# Patient Record
Sex: FEMALE | Race: WHITE | NOT HISPANIC OR LATINO | Employment: OTHER | URBAN - METROPOLITAN AREA
[De-identification: names, ages, dates, MRNs, and addresses within clinical notes are randomized per-mention and may not be internally consistent; named-entity substitution may affect disease eponyms.]

---

## 2017-05-22 ENCOUNTER — OFFICE VISIT - HEALTHEAST (OUTPATIENT)
Dept: INTERNAL MEDICINE | Facility: CLINIC | Age: 37
End: 2017-05-22

## 2017-05-22 DIAGNOSIS — F41.8 SITUATIONAL ANXIETY: ICD-10-CM

## 2017-05-22 DIAGNOSIS — J30.9 ALLERGIC RHINITIS: ICD-10-CM

## 2017-05-22 DIAGNOSIS — F51.04 CHRONIC INSOMNIA: ICD-10-CM

## 2017-05-22 ASSESSMENT — MIFFLIN-ST. JEOR: SCORE: 1426.31

## 2017-06-06 ENCOUNTER — COMMUNICATION - HEALTHEAST (OUTPATIENT)
Dept: SCHEDULING | Facility: CLINIC | Age: 37
End: 2017-06-06

## 2017-07-14 ENCOUNTER — AMBULATORY - HEALTHEAST (OUTPATIENT)
Dept: MULTI SPECIALTY CLINIC | Facility: CLINIC | Age: 37
End: 2017-07-14

## 2017-07-14 LAB — PAP SMEAR - HIM PATIENT REPORTED: NORMAL

## 2017-09-07 ENCOUNTER — COMMUNICATION - HEALTHEAST (OUTPATIENT)
Dept: INTERNAL MEDICINE | Facility: CLINIC | Age: 37
End: 2017-09-07

## 2017-11-10 ENCOUNTER — OFFICE VISIT - HEALTHEAST (OUTPATIENT)
Dept: INTERNAL MEDICINE | Facility: CLINIC | Age: 37
End: 2017-11-10

## 2017-11-10 DIAGNOSIS — Z34.91 FIRST TRIMESTER PREGNANCY: ICD-10-CM

## 2017-11-10 DIAGNOSIS — F51.04 CHRONIC INSOMNIA: ICD-10-CM

## 2017-11-10 DIAGNOSIS — Z23 NEED FOR IMMUNIZATION AGAINST INFLUENZA: ICD-10-CM

## 2017-11-10 DIAGNOSIS — Z00.00 ROUTINE GENERAL MEDICAL EXAMINATION AT A HEALTH CARE FACILITY: ICD-10-CM

## 2017-11-10 ASSESSMENT — MIFFLIN-ST. JEOR: SCORE: 1458.07

## 2017-12-31 ENCOUNTER — RECORDS - HEALTHEAST (OUTPATIENT)
Dept: ADMINISTRATIVE | Facility: OTHER | Age: 37
End: 2017-12-31

## 2018-01-02 ENCOUNTER — OFFICE VISIT - HEALTHEAST (OUTPATIENT)
Dept: INTERNAL MEDICINE | Facility: CLINIC | Age: 38
End: 2018-01-02

## 2018-01-02 DIAGNOSIS — R05.9 COUGH: ICD-10-CM

## 2018-01-02 DIAGNOSIS — J18.9 PNEUMONIA: ICD-10-CM

## 2018-01-02 DIAGNOSIS — Z3A.17 17 WEEKS GESTATION OF PREGNANCY: ICD-10-CM

## 2018-01-02 ASSESSMENT — MIFFLIN-ST. JEOR: SCORE: 1503.42

## 2018-01-05 ENCOUNTER — OFFICE VISIT - HEALTHEAST (OUTPATIENT)
Dept: INTERNAL MEDICINE | Facility: CLINIC | Age: 38
End: 2018-01-05

## 2018-01-05 DIAGNOSIS — J18.9 RIGHT LOWER LOBE PNEUMONIA: ICD-10-CM

## 2018-01-05 ASSESSMENT — MIFFLIN-ST. JEOR: SCORE: 1503.42

## 2018-03-12 ENCOUNTER — OFFICE VISIT - HEALTHEAST (OUTPATIENT)
Dept: INTERNAL MEDICINE | Facility: CLINIC | Age: 38
End: 2018-03-12

## 2018-03-12 ENCOUNTER — COMMUNICATION - HEALTHEAST (OUTPATIENT)
Dept: INTERNAL MEDICINE | Facility: CLINIC | Age: 38
End: 2018-03-12

## 2018-03-12 DIAGNOSIS — J10.1 INFLUENZA B: ICD-10-CM

## 2018-03-12 DIAGNOSIS — R05.9 COUGH: ICD-10-CM

## 2018-03-12 LAB
FLUAV AG SPEC QL IA: ABNORMAL
FLUBV AG SPEC QL IA: ABNORMAL

## 2018-03-12 ASSESSMENT — MIFFLIN-ST. JEOR: SCORE: 1589.61

## 2018-03-13 ENCOUNTER — AMBULATORY - HEALTHEAST (OUTPATIENT)
Dept: INTERNAL MEDICINE | Facility: CLINIC | Age: 38
End: 2018-03-13

## 2018-03-13 ENCOUNTER — COMMUNICATION - HEALTHEAST (OUTPATIENT)
Dept: SCHEDULING | Facility: CLINIC | Age: 38
End: 2018-03-13

## 2018-06-24 ENCOUNTER — RECORDS - HEALTHEAST (OUTPATIENT)
Dept: ADMINISTRATIVE | Facility: OTHER | Age: 38
End: 2018-06-24

## 2018-06-27 ENCOUNTER — RECORDS - HEALTHEAST (OUTPATIENT)
Dept: ADMINISTRATIVE | Facility: OTHER | Age: 38
End: 2018-06-27

## 2019-09-19 ENCOUNTER — RECORDS - HEALTHEAST (OUTPATIENT)
Dept: ADMINISTRATIVE | Facility: OTHER | Age: 39
End: 2019-09-19

## 2019-09-26 ENCOUNTER — RECORDS - HEALTHEAST (OUTPATIENT)
Dept: ADMINISTRATIVE | Facility: OTHER | Age: 39
End: 2019-09-26

## 2019-10-03 ENCOUNTER — RECORDS - HEALTHEAST (OUTPATIENT)
Dept: ADMINISTRATIVE | Facility: OTHER | Age: 39
End: 2019-10-03

## 2019-10-08 ENCOUNTER — RECORDS - HEALTHEAST (OUTPATIENT)
Dept: ADMINISTRATIVE | Facility: OTHER | Age: 39
End: 2019-10-08

## 2019-10-12 ENCOUNTER — RECORDS - HEALTHEAST (OUTPATIENT)
Dept: ADMINISTRATIVE | Facility: OTHER | Age: 39
End: 2019-10-12

## 2019-10-14 ENCOUNTER — RECORDS - HEALTHEAST (OUTPATIENT)
Dept: ADMINISTRATIVE | Facility: OTHER | Age: 39
End: 2019-10-14

## 2019-11-05 ENCOUNTER — COMMUNICATION - HEALTHEAST (OUTPATIENT)
Dept: INTERNAL MEDICINE | Facility: CLINIC | Age: 39
End: 2019-11-05

## 2019-11-29 ENCOUNTER — OFFICE VISIT - HEALTHEAST (OUTPATIENT)
Dept: INTERNAL MEDICINE | Facility: CLINIC | Age: 39
End: 2019-11-29

## 2019-11-29 DIAGNOSIS — J30.9 ALLERGIC RHINITIS, UNSPECIFIED SEASONALITY, UNSPECIFIED TRIGGER: ICD-10-CM

## 2019-11-29 DIAGNOSIS — Z83.719 FAMILY HISTORY OF COLONIC POLYPS: ICD-10-CM

## 2019-11-29 DIAGNOSIS — G89.29 CHRONIC PAIN OF LEFT KNEE: ICD-10-CM

## 2019-11-29 DIAGNOSIS — Z13.220 LIPID SCREENING: ICD-10-CM

## 2019-11-29 DIAGNOSIS — R53.82 CHRONIC FATIGUE: ICD-10-CM

## 2019-11-29 DIAGNOSIS — I47.10 PAROXYSMAL SVT (SUPRAVENTRICULAR TACHYCARDIA) (H): ICD-10-CM

## 2019-11-29 DIAGNOSIS — K59.09 CHRONIC CONSTIPATION: ICD-10-CM

## 2019-11-29 DIAGNOSIS — F51.04 CHRONIC INSOMNIA: ICD-10-CM

## 2019-11-29 DIAGNOSIS — M25.562 CHRONIC PAIN OF LEFT KNEE: ICD-10-CM

## 2019-11-29 DIAGNOSIS — F41.8 DEPRESSION WITH ANXIETY: ICD-10-CM

## 2019-11-29 LAB
ALBUMIN SERPL-MCNC: 4.1 G/DL (ref 3.5–5)
ALP SERPL-CCNC: 61 U/L (ref 45–120)
ALT SERPL W P-5'-P-CCNC: 15 U/L (ref 0–45)
AST SERPL W P-5'-P-CCNC: 17 U/L (ref 0–40)
BILIRUB DIRECT SERPL-MCNC: <0.1 MG/DL
BILIRUB SERPL-MCNC: 0.2 MG/DL (ref 0–1)
CHOLEST SERPL-MCNC: 185 MG/DL
FASTING STATUS PATIENT QL REPORTED: YES
HDLC SERPL-MCNC: 70 MG/DL
HGB BLD-MCNC: 14 G/DL (ref 12–16)
LDLC SERPL CALC-MCNC: 102 MG/DL
PROT SERPL-MCNC: 7.4 G/DL (ref 6–8)
TRIGL SERPL-MCNC: 66 MG/DL
TSH SERPL DL<=0.005 MIU/L-ACNC: 0.52 UIU/ML (ref 0.3–5)

## 2019-11-29 ASSESSMENT — MIFFLIN-ST. JEOR: SCORE: 1512.5

## 2019-11-29 ASSESSMENT — PATIENT HEALTH QUESTIONNAIRE - PHQ9: SUM OF ALL RESPONSES TO PHQ QUESTIONS 1-9: 2

## 2019-12-01 ENCOUNTER — COMMUNICATION - HEALTHEAST (OUTPATIENT)
Dept: INTERNAL MEDICINE | Facility: CLINIC | Age: 39
End: 2019-12-01

## 2020-11-24 ENCOUNTER — RECORDS - HEALTHEAST (OUTPATIENT)
Dept: ADMINISTRATIVE | Facility: OTHER | Age: 40
End: 2020-11-24

## 2021-01-20 ENCOUNTER — VIRTUAL VISIT (OUTPATIENT)
Dept: URGENT CARE | Facility: CLINIC | Age: 41
End: 2021-01-20

## 2021-01-20 DIAGNOSIS — Z74.2 CAREGIVER NOT READILY AVAILABLE: Primary | ICD-10-CM

## 2021-01-20 PROCEDURE — 99207 PR NO BILLABLE SERVICE THIS VISIT: CPT | Mod: 25 | Performed by: EMERGENCY MEDICINE

## 2021-03-23 ENCOUNTER — OFFICE VISIT - HEALTHEAST (OUTPATIENT)
Dept: FAMILY MEDICINE | Facility: CLINIC | Age: 41
End: 2021-03-23

## 2021-03-23 DIAGNOSIS — J32.0 MAXILLARY SINUSITIS, UNSPECIFIED CHRONICITY: ICD-10-CM

## 2021-03-23 ASSESSMENT — PATIENT HEALTH QUESTIONNAIRE - PHQ9: SUM OF ALL RESPONSES TO PHQ QUESTIONS 1-9: 2

## 2021-04-30 ENCOUNTER — RECORDS - HEALTHEAST (OUTPATIENT)
Dept: ADMINISTRATIVE | Facility: OTHER | Age: 41
End: 2021-04-30

## 2021-05-26 ASSESSMENT — PATIENT HEALTH QUESTIONNAIRE - PHQ9: SUM OF ALL RESPONSES TO PHQ QUESTIONS 1-9: 2

## 2021-05-27 ASSESSMENT — PATIENT HEALTH QUESTIONNAIRE - PHQ9: SUM OF ALL RESPONSES TO PHQ QUESTIONS 1-9: 2

## 2021-05-31 VITALS — WEIGHT: 160 LBS | HEIGHT: 70 IN | BODY MASS INDEX: 22.9 KG/M2

## 2021-05-31 VITALS — HEIGHT: 70 IN | WEIGHT: 153 LBS | BODY MASS INDEX: 21.9 KG/M2

## 2021-05-31 VITALS — WEIGHT: 170 LBS | BODY MASS INDEX: 24.34 KG/M2 | HEIGHT: 70 IN

## 2021-06-01 VITALS — BODY MASS INDEX: 27.06 KG/M2 | WEIGHT: 189 LBS | HEIGHT: 70 IN

## 2021-06-02 ENCOUNTER — RECORDS - HEALTHEAST (OUTPATIENT)
Dept: ADMINISTRATIVE | Facility: OTHER | Age: 41
End: 2021-06-02

## 2021-06-03 NOTE — PROGRESS NOTES
Office Visit - Follow Up   Lexi Campuzano   39 y.o. female    Date of Visit: 11/29/2019    Chief Complaint   Patient presents with     Follow-up        Assessment and Plan   1. 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  However, will check appropriate metabolic studies.  Her BMP was normal in September  - Hepatic Profile  - Hemoglobin  - Thyroid Stimulating Hormone (TSH)    2. Chronic insomnia  She will use Benadryl occasionally    3. Depression with anxiety  Mood has been fine.  Some situational depression following miscarriage as would be expected.  Currently her PHQ 9 score is 2    4. Chronic knee pain, left  Chronic pain involving knee is doing much better    5. Allergic rhinitis, unspecified seasonality, unspecified trigger  She will use Zyrtec as needed    6. Chronic constipation  MiraLAX as needed    7. Lipid screening    - Lipid Cascade    8. Family history of colonic polyps  Younger sister found to have polyps.  Lexi had a colonoscopy which was normal in October 2019.    9.  Paroxysmal SVT-no recurrent symptoms with history of ablation.    Return in about 1 year (around 11/29/2020) for Recheck.     History of Present Illness   This 39 y.o. old woman with history of paroxysmal SVT treated with ablation, history of depression with anxiety doing well without medication, and chronic knee pain here to follow-up.  Overall doing well.  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  She had a miscarriage this summer which left her feeling down.  She has chronic insomnia and will take Benadryl occasionally.  Mood doing better since time of miscarriage.  Chronic left knee pain is doing better.  Using Zyrtec for allergies.  Using MiraLAX for chronic constipation.  Sister found to have polyps and she had a colonoscopy which was normal.  History of paroxysmal atrial  "fibrillation.  No recurrent palpitations.  History of ablation.    Review of Systems:  Otherwise, a comprehensive review of systems was negative except as noted.     Medications, Allergies and Problem List   Patient Active Problem List   Diagnosis     Paroxysmal SVT (supraventricular tachycardia) (H)     Bilateral shoulder pain     Depression with anxiety     Chronic insomnia     Chronic knee pain, left     Diffuse arthralgia     Allergic rhinitis     Situational anxiety     Chronic fatigue     Family history of colonic polyps     Chronic constipation       She has a past surgical history that includes Knee surgery (Left, 2013); Nasal septum surgery (2010); and Tonsillectomy (2013).    Allergies   Allergen Reactions     Cymbalta [Duloxetine]      Weight gain     Lexapro [Escitalopram Oxalate]      Fatigue     Zolpidem      Ineffective         Current Outpatient Medications   Medication Sig Dispense Refill     calcium-vitamin D (CALCIUM-VITAMIN D) 500 mg(1,250mg) -200 unit per tablet Take 1 tablet by mouth 2 (two) times a day with meals.       cetirizine (ZYRTEC) 10 MG tablet Take 10 mg by mouth daily.       cholecalciferol, vitamin D3, (CHOLECALCIFEROL) 1,000 unit tablet Take 1,000 Units by mouth daily.       diphenhydrAMINE (BENADRYL) 25 mg capsule Take 25 mg by mouth at bedtime as needed for itching.       multivitamin therapeutic (THERAGRAN) tablet Take 1 tablet by mouth daily.       No current facility-administered medications for this visit.         Physical Exam   General Appearance:   Well-appearing young woman    /60 (Patient Site: Left Arm, Patient Position: Sitting, Cuff Size: Adult Large)   Pulse 84   Ht 5' 9.5\" (1.765 m)   Wt 172 lb (78 kg)   SpO2 99%   BMI 25.04 kg/m      HEENT: Normal  Neck without lymphadenopathy or thyromegaly  Respiratory: Normal respiratory effort.  Lungs are clear with no rales or wheezes.  Heart: Regular rate and rhythm without murmurs, rubs, or gallops.  No carotid " bruits.  Abdomen: Abdomen is soft, nontender without guarding, rebound, masses, or hepatosplenomegaly.  Neurologic: Grossly nonfocal  Skin: No cyanosis or pallor  Psych: Alert and oriented ×3, mood appropriate.  PHQ 9 score is 2         Additional Information   Social History     Tobacco Use     Smoking status: Never Smoker     Smokeless tobacco: Never Used   Substance Use Topics     Alcohol use: Not on file     Drug use: Not on file         Review and/or order of clinical lab tests: Ordering TSH, lipid profile, LFTs and hemoglobin  Review and/or order of radiology tests: Reviewed CTA of head and neck normal September 2019 when presenting with headache  Review and/or order of medicine tests: Reviewed results of colonoscopy October 2019 normal without polyps       Juan Kearney MD

## 2021-06-04 VITALS
SYSTOLIC BLOOD PRESSURE: 110 MMHG | DIASTOLIC BLOOD PRESSURE: 60 MMHG | WEIGHT: 172 LBS | OXYGEN SATURATION: 99 % | BODY MASS INDEX: 24.62 KG/M2 | HEIGHT: 70 IN | HEART RATE: 84 BPM

## 2021-06-06 ENCOUNTER — RECORDS - HEALTHEAST (OUTPATIENT)
Dept: ADMINISTRATIVE | Facility: OTHER | Age: 41
End: 2021-06-06

## 2021-06-06 ENCOUNTER — HEALTH MAINTENANCE LETTER (OUTPATIENT)
Age: 41
End: 2021-06-06
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
## 2021-06-15 NOTE — PROGRESS NOTES
Office Visit - Follow Up   Lexi Salcido   37 y.o. female    Date of Visit: 1/5/2018    Chief Complaint   Patient presents with     Follow-up     pneumonia        Assessment and Plan   Right lower lobe pneumonia-we will treat with another round of Zithromax.  Continue Mucinex and Tylenol.    Advised against using Diflucan as it is category C.  She should use OTC Monistat and eat yogurt.    No Follow-up on file.     History of Present Illness   This 37 y.o. old woman is here to follow-up diagnosis of pneumonia.  She is 17 weeks pregnant.  Chest x-ray showed right lower lobe infiltrate.  She had had persistent cough for 2 weeks.  Given Z-Luis Eduardo.  Was seen 2 days ago and is following up again today.  She took her last dose yesterday.  Still feeling fatigued and run down.  Cough is ongoing.  No wheezing.  Also using Tylenol and OTC Mucinex.    Review of Systems:  Otherwise, a comprehensive review of systems was negative except as noted.     Medications, Allergies and Problem List   Patient Active Problem List   Diagnosis     Paroxysmal SVT (supraventricular tachycardia)     Bilateral shoulder pain     Depression with anxiety     Chronic insomnia     Chronic knee pain, left     Diffuse arthralgia     Allergic rhinitis     Situational anxiety       She has a past surgical history that includes Knee surgery (Left, 2013); Nasal septum surgery (2010); and Tonsillectomy (2013).    Allergies   Allergen Reactions     Cymbalta [Duloxetine]      Weight gain     Lexapro [Escitalopram Oxalate]      Fatigue     Zolpidem      Ineffective         Current Outpatient Prescriptions   Medication Sig Dispense Refill     calcium-vitamin D (CALCIUM-VITAMIN D) 500 mg(1,250mg) -200 unit per tablet Take 1 tablet by mouth 2 (two) times a day with meals.       cetirizine (ZYRTEC) 10 MG tablet Take 10 mg by mouth daily.       cholecalciferol, vitamin D3, (CHOLECALCIFEROL) 1,000 unit tablet Take 1,000 Units by mouth daily.       diphenhydrAMINE  "(BENADRYL) 25 mg capsule Take 25 mg by mouth at bedtime as needed for itching.       multivitamin therapeutic (THERAGRAN) tablet Take 1 tablet by mouth daily.       azithromycin (ZITHROMAX Z-OSWALD) 250 MG tablet Take 2 tablets (500 mg) on  Day 1,  followed by 1 tablet (250 mg) once daily on Days 2 through 5. 6 tablet 0     No current facility-administered medications for this visit.         Physical Exam   General Appearance:   Well-appearing woman    /60 (Patient Site: Left Arm, Patient Position: Sitting, Cuff Size: Adult Regular)  Pulse 89  Ht 5' 9.5\" (1.765 m)  Wt 170 lb (77.1 kg)  SpO2 99%  BMI 24.74 kg/m2    HEENT: Normal, oropharynx normal  Neck without lymphadenopathy  Respiratory: Normal respiratory effort.  Lungs are clear with no rales or wheezes.  Heart: Regular rate and rhythm        Additional Information   Social History   Substance Use Topics     Smoking status: Never Smoker     Smokeless tobacco: Never Used     Alcohol use None              Juan Kearney MD  "

## 2021-06-15 NOTE — PROGRESS NOTES
Office Visit - Follow Up   Lexi Salcido   37 y.o. female    Date of Visit: 1/2/2018    Chief Complaint   Patient presents with     Cough     went to urgency room and was dx with pneumonia. 17 weeks pregnant. Took 3 doses of Zpack.         Assessment and Plan   1. Cough  Coughing since mid December accompanied by feeling rundown and tired.  Was on the go all the time.  Came from [place] for a holiday in early December followed by a business trips to [place] and [place] and followed by trip to [place] to attend a wedding a week ago.  Continued to work despite her cough.  Finally went to urgency room on 12/31/2017 to be evaluated because she was feeling weak and tired.    2. Pneumonia  Was worked up in the  urgency room on 12/31/2017.  Chest x-ray showed right lower lobe pneumonia.  Had normal CBC specifically white blood cell count of 6.3 and normal basic metabolic panel.  Was treated with Z-Luis Eduardo which she is finishing for 2 more days.    3. 17 weeks gestation of pregnancy  Pregnant  at 17 weeks.  Z-Luis Eduardo is category B for pregnancy. Tylenol for aches and pains, loratadine for congestion and guaifenesin for cough are safe for pregnancy.  Discussed these with the patient and .    Reviewed emergency room notes and workups, labs and chest x-ray    Advised to rest and not to report for work until she is better.  Advised  to increase fluid intake or to push fluids.  Also to rest her voice since it is getting hoarse.      Follow up with Dr. Kearney in 3 days as scheduled previously.     History of Present Illness   This 37 y.o. old female patient of Dr. Kearney came to see me for follow-up of her pneumonia.  Has been coughing since early December.  Did not have time to rest.  Has been  always  on the go all the time since since early this month.  Went for a holiday in [place] and came back mid December.  Then went for business trips to [place] and [place] followed by trip to [place] a week ago for   a wedding. After which she became weak and tired.  Did did not have have appetite to eat.  So she went to the urgency room.  Was worked up.  Was found to have right lower lobe pneumonia on her chest x-ray  but had normal white cell count.  Was treated with Z-Luis Eduardo.  Seems to be feeling better but still coughing and congested.    Review of Systems   A 12 point comprehensive review of systems was negative except as noted..     Medications, Allergies and Problem List   Reviewed and updated             Chief Complaint   Cough (went to urgency room and was dx with pneumonia. 17 weeks pregnant. Took 3 doses of Zpack. )       Patient Profile   Social History     Social History Narrative        Past Medical History   Patient Active Problem List   Diagnosis     Paroxysmal SVT (supraventricular tachycardia)     Bilateral shoulder pain     Depression with anxiety     Chronic insomnia     Chronic knee pain, left     Diffuse arthralgia     Allergic rhinitis     Situational anxiety       Past Surgical History  She has a past surgical history that includes Knee surgery (Left, 2013); Nasal septum surgery (2010); and Tonsillectomy (2013).       Medications and Allergies   Current Outpatient Prescriptions   Medication Sig     multivitamin therapeutic (THERAGRAN) tablet Take 1 tablet by mouth daily.     calcium-vitamin D (CALCIUM-VITAMIN D) 500 mg(1,250mg) -200 unit per tablet Take 1 tablet by mouth 2 (two) times a day with meals.     cetirizine (ZYRTEC) 10 MG tablet Take 10 mg by mouth daily.     cholecalciferol, vitamin D3, (CHOLECALCIFEROL) 1,000 unit tablet Take 1,000 Units by mouth daily.     diphenhydrAMINE (BENADRYL) 25 mg capsule Take 25 mg by mouth at bedtime as needed for itching.     Allergies   Allergen Reactions     Cymbalta [Duloxetine]      Weight gain     Lexapro [Escitalopram Oxalate]      Fatigue     Zolpidem      Ineffective          Family and Social History   No family history on file.     Social History  "  Substance Use Topics     Smoking status: Never Smoker     Smokeless tobacco: Never Used     Alcohol use None        Physical Exam       Physical Exam  /70  Pulse 90  Ht 5' 9.5\" (1.765 m)  Wt 170 lb (77.1 kg)  SpO2 97%  BMI 24.74 kg/m2  General appearance: alert, appears stated age, cooperative, no distress and tired looking  Head: Normocephalic, without obvious abnormality, atraumatic  Ears: normal TM's and external ear canals both ears  Nose: Nares normal. Septum midline. Mucosa normal. No drainage or sinus tenderness.  Throat: lips, mucosa, and tongue normal; teeth and gums normal  Neck: no adenopathy, no carotid bruit, no JVD, supple, symmetrical, trachea midline and thyroid not enlarged, symmetric, no tenderness/mass/nodules  Lungs: clear to auscultation bilaterally  Heart: regular rate and rhythm, S1, S2 normal, no murmur, click, rub or gallop  Abdomen: soft, non-tender; bowel sounds normal; no masses,  no organomegaly  Extremities: extremities normal, atraumatic, no cyanosis or edema  Skin: Skin color, texture, turgor normal. No rashes or lesions     Additional Information        Karlo Hinkle MD  Internal Medicine  Contact me at [phone]     Additional Information   Current Outpatient Prescriptions   Medication Sig     multivitamin therapeutic (THERAGRAN) tablet Take 1 tablet by mouth daily.     calcium-vitamin D (CALCIUM-VITAMIN D) 500 mg(1,250mg) -200 unit per tablet Take 1 tablet by mouth 2 (two) times a day with meals.     cetirizine (ZYRTEC) 10 MG tablet Take 10 mg by mouth daily.     cholecalciferol, vitamin D3, (CHOLECALCIFEROL) 1,000 unit tablet Take 1,000 Units by mouth daily.     diphenhydrAMINE (BENADRYL) 25 mg capsule Take 25 mg by mouth at bedtime as needed for itching.     Allergies   Allergen Reactions     Cymbalta [Duloxetine]      Weight gain     Lexapro [Escitalopram Oxalate]      Fatigue     Zolpidem      Ineffective       Social History   Substance Use Topics     " Smoking status: Never Smoker     Smokeless tobacco: Never Used     Alcohol use None         Time: total time spent with the patient was 25 minutes of which >50% was spent in counseling and coordination of care

## 2021-06-16 NOTE — PROGRESS NOTES
Office Visit - Follow Up   Lexi Salcido   37 y.o. female    Date of Visit: 3/12/2018    Chief Complaint   Patient presents with     Cough     26 week pregnant, had pneumona early this year        Assessment and Plan   1. Influenza B  Rapid influenza swab has returned positive for influenza B.  Begin Tamiflu 75 mg twice daily for 5 days.  She can continue OTC Robitussin.  Also Tylenol as needed.  She should notify her OB/GYN.  A note is provided for her to stay off of work the rest of the week.        No Follow-up on file.     History of Present Illness   This 37 y.o. old woman who is 26 weeks pregnant here with worsening cough over the past 4 days.  She is concerned as symptoms are feeling similar to when she had pneumonia this winter.  She required a Z-Luis Eduardo at that time.  Cough is generally nonproductive.  She is feeling malaise.  No fevers or chills.  No sore throat.  Some body aches.  She has tried OTC cough syrup and Mucinex without any relief.    Review of Systems: No chest pain no nausea     Medications, Allergies and Problem List   Patient Active Problem List   Diagnosis     Paroxysmal SVT (supraventricular tachycardia)     Bilateral shoulder pain     Depression with anxiety     Chronic insomnia     Chronic knee pain, left     Diffuse arthralgia     Allergic rhinitis     Situational anxiety     Right lower lobe pneumonia     Influenza B       She has a past surgical history that includes Knee surgery (Left, 2013); Nasal septum surgery (2010); and Tonsillectomy (2013).    Allergies   Allergen Reactions     Cymbalta [Duloxetine]      Weight gain     Lexapro [Escitalopram Oxalate]      Fatigue     Zolpidem      Ineffective         Current Outpatient Prescriptions   Medication Sig Dispense Refill     calcium-vitamin D (CALCIUM-VITAMIN D) 500 mg(1,250mg) -200 unit per tablet Take 1 tablet by mouth 2 (two) times a day with meals.       cetirizine (ZYRTEC) 10 MG tablet Take 10 mg by mouth daily.        "cholecalciferol, vitamin D3, (CHOLECALCIFEROL) 1,000 unit tablet Take 1,000 Units by mouth daily.       diphenhydrAMINE (BENADRYL) 25 mg capsule Take 25 mg by mouth at bedtime as needed for itching.       multivitamin therapeutic (THERAGRAN) tablet Take 1 tablet by mouth daily.       oseltamivir (TAMIFLU) 75 MG capsule Take 1 capsule (75 mg total) by mouth 2 (two) times a day for 5 days. 10 capsule 0     No current facility-administered medications for this visit.         Physical Exam   General Appearance:   Well-appearing young woman    /60  Pulse 92  Ht 5' 9.5\" (1.765 m)  Wt 189 lb (85.7 kg)  SpO2 98%  BMI 27.51 kg/m2    HEENT: Normal, TMs normal, oropharynx normal  Neck without lymphadenopathy  Respiratory: Normal respiratory effort.  Lungs are clear with no rales or wheezes.  Heart: Regular rate and rhythm          Additional Information   Social History   Substance Use Topics     Smoking status: Never Smoker     Smokeless tobacco: Never Used     Alcohol use None              Juan Kearney MD  "

## 2021-06-16 NOTE — PROGRESS NOTES
Lexi Campuzano is a 41 y.o. female who is being evaluated via a billable video visit.      How would you like to obtain your AVS? MyChart.  If dropped from the video visit, the video invitation should be resent by: Text to cell phone: [phone]  Will anyone else be joining your video visit? No      Video Start Time: 2:00    Assessment & Plan     Maxillary sinusitis, unspecified chronicity  - azithromycin (ZITHROMAX) 250 MG tablet  Dispense: 6 tablet; Refill: 0  - fluconazole (DIFLUCAN) 150 MG tablet  Dispense: 2 tablet; Refill: 0      8 minutes spent on the date of the encounter doing chart review, history and exam, documentation and further activities as noted above       No follow-ups on file.    Paco Lozano CNP  [facility]    Subjective   Lexi Campuzano is 41 y.o. and presents today for the following health issues   HPI   Lots of nasal congestion.  Postnasal drip.  Mild fever.    Has had issues with sinusitis in the past and responded well to azithromycin.    Currently breast-feeding.    [facility] and and told she was likely dealing with a viral illness    No chest pain or shortness of breath.  No Covid contacts as far she knows    Review of Systems  Negative          Video-Visit Details    Type of service:  Video Visit    Video End Time (time video stopped): 2:08 PM  Originating Location (pt. Location): Home    Distant Location (provider location):  [facility]     Platform used for Video Visit: [employer]

## 2021-06-19 NOTE — LETTER
Letter by Juan Kearney MD at      Author: Juan Kearney MD Service: -- Author Type: --    Filed:  Encounter Date: 12/1/2019 Status: Signed         Lexi BILLY [address] Frogelio  [address] [facility] [address] [address]             December 1, 2019         Dear Lexi,    Below are the results from your recent visit:    Resulted Orders   Hepatic Profile   Result Value Ref Range    Bilirubin, Total 0.2 0.0 - 1.0 mg/dL    Bilirubin, Direct <0.1 <=0.5 mg/dL    Protein, Total 7.4 6.0 - 8.0 g/dL    Albumin 4.1 3.5 - 5.0 g/dL    Alkaline Phosphatase 61 45 - 120 U/L    AST 17 0 - 40 U/L    ALT 15 0 - 45 U/L   Lipid Cascade   Result Value Ref Range    Cholesterol 185 <=199 mg/dL    Triglycerides 66 <=149 mg/dL    HDL Cholesterol 70 >=50 mg/dL    LDL Calculated 102 <=129 mg/dL    Patient Fasting > 8hrs? Yes    Hemoglobin   Result Value Ref Range    Hemoglobin 14.0 12.0 - 16.0 g/dL   Thyroid Stimulating Hormone (TSH)   Result Value Ref Range    TSH 0.52 0.30 - 5.00 uIU/mL       Your cholesterol is excellent.  No anemia.  Normal thyroid function.  Normal liver studies.    Please call with questions or contact us using [employer].    Sincerely,        Electronically signed by Juan Kearney MD

## 2021-09-26 ENCOUNTER — HEALTH MAINTENANCE LETTER (OUTPATIENT)
Age: 41
End: 2021-09-26

## 2021-12-10 ENCOUNTER — VIRTUAL VISIT (OUTPATIENT)
Dept: FAMILY MEDICINE | Facility: CLINIC | Age: 41
End: 2021-12-10
Payer: COMMERCIAL

## 2021-12-10 DIAGNOSIS — J01.01 ACUTE RECURRENT MAXILLARY SINUSITIS: Primary | ICD-10-CM

## 2021-12-10 PROCEDURE — 99213 OFFICE O/P EST LOW 20 MIN: CPT | Mod: 95 | Performed by: FAMILY MEDICINE

## 2021-12-10 NOTE — PROGRESS NOTES
Lexi is a 41 year old who is being evaluated via a billable video visit.      How would you like to obtain your AVS? MyChart  If the video visit is dropped, the invitation should be resent by: Text to cell phone: [phone]  Will anyone else be joining your video visit? No    Video Start Time: 10:41 AM    Assessment & Plan     Acute recurrent maxillary sinusitis  History and clinical presentation are consistent with acute sinusitis. Patient was started on an antibiotic today. Advised her to continue with Trent's, vaporizers and plain guaifenesin and dextromethorphan. We breifly discussed the risk of pneumonia during pregnancy. Advised her to schedule a visit or proceed to the ER if symptoms got worse or if she spiked a fever despite taking an antibiotic.  - amoxicillin-clavulanate (AUGMENTIN) 875-125 MG tablet; Take 1 tablet by mouth 2 times daily for 10 days    Return in about 3 days (around 12/13/2021) for Follow-up visit-  if symptoms failed to improve.    Claribel Aquino MD  [facility]   Lexi is a 41 year old who presents for the following health issues:    HPI     Acute Illness  Acute illness concerns: cough, nasal congestion and post nasal drainage.   Onset/Duration: since Sunday 12/5/21  Symptoms:  Fever: no  Chills/Sweats: no  Headache (location?): no  Sinus Pressure: YES  Conjunctivitis:  no  Ear Pain: no  Rhinorrhea: YES  Congestion: YES  Sore Throat: no  Cough: YES-productive of clear sputum  Wheeze: no  Decreased Appetite: no  Nausea: no  Vomiting: no  Therapies tried and outcome: Trent Zavaleta's    41 yo who scheduled a virtual visit to discuss worsening upper respiratory tract symptoms. The patient is currently at 12 wks and 2 days. Symptoms started as a post nasal drainage and pressure on her sinus area and progressed to upper chest tightness and intermittent cough. She denies any fevers or chills.    The patient was seen at an acute care clinic on Monday  and was informed to wait until Friday prior to taking any antibiotics.     Patient also reports a similar episode during her last pregnancy.     Review of Systems         Objective         Vitals:  No vitals were obtained today due to virtual visit.    Physical Exam   GENERAL: Healthy, alert and no distress  EYES: Eyes grossly normal to inspection.  No discharge or erythema, or obvious scleral/conjunctival abnormalities.  RESP: No audible wheeze, cough, or visible cyanosis.  No visible retractions or increased work of breathing.    SKIN: Visible skin clear. No significant rash, abnormal pigmentation or lesions.  NEURO: Cranial nerves grossly intact.  Mentation and speech appropriate for age.  PSYCH: Mentation appears normal, affect normal/bright, judgement and insight intact, normal speech and appearance well-groomed.    No results found for any visits on 12/10/21.        Video-Visit Details    Type of service:  Video Visit    Video End Time:10:41 AM    Originating Location (pt. Location): Home    Distant Location (provider location):  [facility]     Platform used for Video Visit: [employer]

## 2021-12-10 NOTE — PATIENT INSTRUCTIONS
Patient Education     Sinusitis (Antibiotic Treatment)    The sinuses are air-filled spaces within the bones of the face. They connect to the inside of the nose. Sinusitis is an inflammation of the tissue that lines the sinuses. Sinusitis can occur during a cold. It can also happen due to allergies to pollens and other particles in the air. Sinusitis can cause symptoms of sinus congestion and a feeling of fullness. A sinus infection causes fever, headache, and facial pain. There is often green or yellow fluid draining from the nose or into the back of the throat (post-nasal drip). You have been given antibiotics to treat this condition.   Home care    Take the full course of antibiotics as instructed. Don't stop taking them, even when you feel better.    Drink plenty of water, hot tea, and other liquids as directed by the healthcare provider. This may help thin nasal mucus. It also may help your sinuses drain fluids.    Heat may help soothe painful areas of your face. Use a towel soaked in hot water. Or,  the shower and direct the warm spray onto your face. Using a vaporizer along with a menthol rub at night may also help soothe symptoms.     An expectorant with guaifenesin may help thin nasal mucus and help your sinuses drain fluids. Talk with your provider or pharmacists before taking an over-the-counter (OTC) medicine if you have any questions about it or its side effects..    You can use an OTC decongestant, unless a similar medicine was prescribed to you. Nasal sprays work the fastest. Use one that contains phenylephrine or oxymetazoline. First blow your nose gently. Then use the spray. Don't use these medicines more often than directed on the label. If you do, your symptoms may get worse. You may also take pills that contain pseudoephedrine. Don t use products that combine multiple medicines. This is because side effects may be increased. Read labels. You can also ask the pharmacist for help. (People  with high blood pressure should not use decongestants. They can raise blood pressure.) Talk with your provider or pharmacist if you have any questions about the medicine..    OTC antihistamines may help if allergies contributed to your sinusitis. Talk with your provider or pharmacist if you have any questions about the medicine..    Don't use nasal rinses or irrigation during an acute sinus infection, unless your healthcare provider tells you to. Rinsing may spread the infection to other areas in your sinuses.    Use acetaminophen or ibuprofen to control pain, unless another pain medicine was prescribed to you. If you have chronic liver or kidney disease or ever had a stomach ulcer, talk with your healthcare provider before using these medicines. Never give aspirin to anyone under age 18 who is ill with a fever. It may cause severe liver damage.    Don't smoke. This can make symptoms worse.    Follow-up care  Follow up with your healthcare provider, or as advised.   When to seek medical advice  Call your healthcare provider if any of these occur:     Facial pain or headache that gets worse    Stiff neck    Unusual drowsiness or confusion    Swelling of your forehead or eyelids    Symptoms don't go away in 10 days    Vision problems, such as blurred or double vision    Fever of 100.4 F (38 C) or higher, or as directed by your healthcare provider  Call 911  Call 911 if any of these occur:     Seizure    Trouble breathing    Feeling dizzy or faint    Fingernails, skin or lips look blue, purple , or gray  Prevention  Here are steps you can take to help prevent an infection:     Keep good hand washing habits.    Don t have close contact with people who have sore throats, colds, or other upper respiratory infections.    Don t smoke, and stay away from secondhand smoke.    Stay up to date with of your vaccines.  [employer] last reviewed this educational content on 12/1/2019 2000-2021 [employer]. All rights  reserved. This information is not intended as a substitute for professional medical care. Always follow your healthcare professional's instructions.

## 2022-07-03 ENCOUNTER — HEALTH MAINTENANCE LETTER (OUTPATIENT)
Age: 42
End: 2022-07-03

## 2023-01-18 ENCOUNTER — TRANSFERRED RECORDS (OUTPATIENT)
Dept: HEALTH INFORMATION MANAGEMENT | Facility: CLINIC | Age: 43
End: 2023-01-18

## 2023-04-23 ENCOUNTER — HEALTH MAINTENANCE LETTER (OUTPATIENT)
Age: 43
End: 2023-04-23

## 2023-07-16 ENCOUNTER — HEALTH MAINTENANCE LETTER (OUTPATIENT)
Age: 43
End: 2023-07-16

## 2023-11-01 ENCOUNTER — TRANSFERRED RECORDS (OUTPATIENT)
Dept: HEALTH INFORMATION MANAGEMENT | Facility: CLINIC | Age: 43
End: 2023-11-01
Payer: COMMERCIAL

## 2024-02-11 ENCOUNTER — HEALTH MAINTENANCE LETTER (OUTPATIENT)
Age: 44
End: 2024-02-11

## 2024-08-15 ENCOUNTER — TRANSFERRED RECORDS (OUTPATIENT)
Dept: MULTI SPECIALTY CLINIC | Facility: CLINIC | Age: 44
End: 2024-08-15

## 2024-08-15 LAB — PAP SMEAR - HIM PATIENT REPORTED: NORMAL

## 2024-11-06 ENCOUNTER — OFFICE VISIT (OUTPATIENT)
Dept: INTERNAL MEDICINE | Facility: CLINIC | Age: 44
End: 2024-11-06
Payer: COMMERCIAL

## 2024-11-06 VITALS
BODY MASS INDEX: 25.62 KG/M2 | RESPIRATION RATE: 18 BRPM | HEIGHT: 70 IN | TEMPERATURE: 98 F | DIASTOLIC BLOOD PRESSURE: 60 MMHG | OXYGEN SATURATION: 98 % | SYSTOLIC BLOOD PRESSURE: 108 MMHG | HEART RATE: 85 BPM | WEIGHT: 179 LBS

## 2024-11-06 DIAGNOSIS — Z13.9 SCREENING FOR CONDITION: ICD-10-CM

## 2024-11-06 DIAGNOSIS — G89.29 CHRONIC BILATERAL LOW BACK PAIN WITHOUT SCIATICA: ICD-10-CM

## 2024-11-06 DIAGNOSIS — M54.50 CHRONIC BILATERAL LOW BACK PAIN WITHOUT SCIATICA: ICD-10-CM

## 2024-11-06 DIAGNOSIS — J06.9 UPPER RESPIRATORY TRACT INFECTION, UNSPECIFIED TYPE: Primary | ICD-10-CM

## 2024-11-06 PROBLEM — O35.BXX0 PREGNANCY COMPLICATED BY FETAL CONGENITAL HEART DISEASE: Status: RESOLVED | Noted: 2024-07-02 | Resolved: 2024-11-06

## 2024-11-06 PROBLEM — F41.8 SITUATIONAL ANXIETY: Status: ACTIVE | Noted: 2017-05-22

## 2024-11-06 PROBLEM — M25.562 CHRONIC PAIN OF LEFT KNEE: Status: RESOLVED | Noted: 2024-11-06 | Resolved: 2024-11-06

## 2024-11-06 PROBLEM — M25.562 CHRONIC PAIN OF LEFT KNEE: Status: ACTIVE | Noted: 2024-11-06

## 2024-11-06 PROBLEM — M25.512 BILATERAL SHOULDER PAIN: Status: RESOLVED | Noted: 2024-11-06 | Resolved: 2024-11-06

## 2024-11-06 PROBLEM — F41.8 DEPRESSION WITH ANXIETY: Status: ACTIVE | Noted: 2024-11-06

## 2024-11-06 PROBLEM — R53.82 CHRONIC FATIGUE: Status: ACTIVE | Noted: 2019-11-29

## 2024-11-06 PROBLEM — I47.10 PAROXYSMAL SVT (SUPRAVENTRICULAR TACHYCARDIA) (H): Status: ACTIVE | Noted: 2021-06-02

## 2024-11-06 PROBLEM — M25.511 BILATERAL SHOULDER PAIN: Status: RESOLVED | Noted: 2024-11-06 | Resolved: 2024-11-06

## 2024-11-06 PROBLEM — F41.8 SITUATIONAL ANXIETY: Status: RESOLVED | Noted: 2017-05-22 | Resolved: 2024-11-06

## 2024-11-06 PROBLEM — K59.09 CHRONIC CONSTIPATION: Status: ACTIVE | Noted: 2019-11-29

## 2024-11-06 PROBLEM — M25.50 DIFFUSE ARTHRALGIA: Status: RESOLVED | Noted: 2024-11-06 | Resolved: 2024-11-06

## 2024-11-06 PROBLEM — O35.BXX0 PREGNANCY COMPLICATED BY FETAL CONGENITAL HEART DISEASE: Status: ACTIVE | Noted: 2024-07-02

## 2024-11-06 PROBLEM — M25.511 BILATERAL SHOULDER PAIN: Status: ACTIVE | Noted: 2024-11-06

## 2024-11-06 PROBLEM — M25.512 BILATERAL SHOULDER PAIN: Status: ACTIVE | Noted: 2024-11-06

## 2024-11-06 PROBLEM — M25.50 DIFFUSE ARTHRALGIA: Status: ACTIVE | Noted: 2024-11-06

## 2024-11-06 PROBLEM — K59.09 CHRONIC CONSTIPATION: Status: RESOLVED | Noted: 2019-11-29 | Resolved: 2024-11-06

## 2024-11-06 PROBLEM — O28.5 ABNORMAL GENETIC TEST DURING PREGNANCY: Status: ACTIVE | Noted: 2024-07-02

## 2024-11-06 PROBLEM — Z83.719 FAMILY HISTORY OF COLONIC POLYPS: Status: RESOLVED | Noted: 2019-11-29 | Resolved: 2024-11-06

## 2024-11-06 PROBLEM — Z83.719 FAMILY HISTORY OF COLONIC POLYPS: Status: ACTIVE | Noted: 2019-11-29

## 2024-11-06 PROBLEM — J30.9 ALLERGIC RHINITIS: Status: ACTIVE | Noted: 2024-11-06

## 2024-11-06 PROBLEM — F51.04 CHRONIC INSOMNIA: Status: ACTIVE | Noted: 2024-11-06

## 2024-11-06 PROBLEM — R53.82 CHRONIC FATIGUE: Status: RESOLVED | Noted: 2019-11-29 | Resolved: 2024-11-06

## 2024-11-06 LAB
FLUAV RNA SPEC QL NAA+PROBE: NEGATIVE
FLUBV RNA RESP QL NAA+PROBE: NEGATIVE
HCG UR QL: NEGATIVE
RSV RNA SPEC NAA+PROBE: NEGATIVE
SARS-COV-2 RNA RESP QL NAA+PROBE: NEGATIVE

## 2024-11-06 PROCEDURE — G2211 COMPLEX E/M VISIT ADD ON: HCPCS | Performed by: INTERNAL MEDICINE

## 2024-11-06 PROCEDURE — 87637 SARSCOV2&INF A&B&RSV AMP PRB: CPT | Performed by: INTERNAL MEDICINE

## 2024-11-06 PROCEDURE — 99204 OFFICE O/P NEW MOD 45 MIN: CPT | Performed by: INTERNAL MEDICINE

## 2024-11-06 PROCEDURE — 81025 URINE PREGNANCY TEST: CPT | Performed by: INTERNAL MEDICINE

## 2024-11-06 RX ORDER — BENZONATATE 100 MG/1
100 CAPSULE ORAL 3 TIMES DAILY PRN
Qty: 20 CAPSULE | Refills: 0 | Status: SHIPPED | OUTPATIENT
Start: 2024-11-06

## 2024-11-06 RX ORDER — SERTRALINE HYDROCHLORIDE 100 MG/1
100 TABLET, FILM COATED ORAL DAILY
COMMUNITY

## 2024-11-06 ASSESSMENT — ENCOUNTER SYMPTOMS: COUGH: 1

## 2024-11-06 NOTE — PROGRESS NOTES
"  Assessment & Plan     Upper respiratory tract infection, unspecified type  44-year-old woman who has had sinus congestion and cough with generalized malaise for the past 5 days.  There is some sore throat and mild headache.   and one of her children are also sick.  No one has completed any home COVID testing.  Lungs are clear bilaterally and O2 sats are 98% on room air.  No history of asthma.  No shortness of breath.  Will check her for COVID along with RSV and influenza.  No antibiotics are indicated at this time.  She can continue over-the-counter acetaminophen and Robitussin but can also try Tessalon 100 mg 3 times daily as needed for cough.  - Symptomatic Influenza A/B, RSV, & SARS-CoV2 PCR (COVID-19) Nasopharyngeal; Future  - benzonatate (TESSALON) 100 MG capsule; Take 1 capsule (100 mg) by mouth 3 times daily as needed for cough.    Chronic bilateral low back pain without sciatica  She has been experiencing severe pain across her low back for several months.  She is questioning whether this could represent inflammatory arthritis as she does have psoriasis.  I am recommending obtaining lumbar spine x-ray and checking a sed rate and CRP.  If all of this is normal, referral to physical therapy would be recommended.  - XR Lumbar Spine 2/3 Views; Future  - ESR: Erythrocyte sedimentation rate; Future  - CRP, inflammation; Future    Screening for condition  Amenorrhea for 2 months.  Checking urine pregnancy test.  - HCG qualitative urine; Future    The longitudinal plan of care for the diagnosis(es)/condition(s) as documented were addressed during this visit. Due to the added complexity in care, I will continue to support Lexi in the subsequent management and with ongoing continuity of care.       BMI  Estimated body mass index is 26.05 kg/m  as calculated from the following:    Height as of this encounter: 1.765 m (5' 9.5\").    Weight as of this encounter: 81.2 kg (179 lb).         Follow-up for annual " "physical in the next 3 to 6 months    Subjective   Lexi is a 44 year old, presenting for the following health issues: Here with 5 days of cough and sinus congestion along with concerns for chronic low back pain and possible pregnancy.  See assessment and plan for details  Cough and Pregnancy Test (Would like to do a pregnancy)      11/6/2024     1:37 PM   Additional Questions   Roomed by      Via the [employer] Maintenance questionnaire, the patient has reported the following services have been completed -Cervical Cancer Screening: kostas 2024-08-15, this information has been sent to the abstraction team.  Cough    History of Present Illness       Reason for visit:  Sick or things  Symptom onset:  3-7 days ago  Symptoms include:  Cough, sinus  Symptom intensity:  Severe  Symptom progression:  Worsening  Had these symptoms before:  Yes  Has tried/received treatment for these symptoms:  Yes  Previous treatment was successful:  No  What makes it worse:  Laying down and being really active  What makes it better:  No   She is taking medications regularly.                 Review of Systems  There is headache.  Sore throat.      Objective    /60 (BP Location: Right arm, Patient Position: Sitting, Cuff Size: Adult Regular)   Pulse 85   Temp 98  F (36.7  C) (Oral)   Resp 18   Ht 1.765 m (5' 9.5\")   Wt 81.2 kg (179 lb)   LMP 09/08/2024 (Approximate)   SpO2 98%   Breastfeeding No   BMI 26.05 kg/m    Body mass index is 26.05 kg/m .  Physical Exam     Well-appearing middle-age woman who is not experiencing any acute respiratory difficulty with O2 sats of 98% on room air  No cervical lymphadenopathy  Lungs clear bilaterally.  Heart regular rate and rhythm            Signed Electronically by: Juan Kearney MD    "

## 2024-11-08 DIAGNOSIS — J06.9 UPPER RESPIRATORY TRACT INFECTION, UNSPECIFIED TYPE: Primary | ICD-10-CM

## 2024-11-08 RX ORDER — AZITHROMYCIN 250 MG/1
TABLET, FILM COATED ORAL
Qty: 6 TABLET | Refills: 0 | Status: SHIPPED | OUTPATIENT
Start: 2024-11-08 | End: 2024-11-13

## 2024-11-26 ENCOUNTER — ANCILLARY PROCEDURE (OUTPATIENT)
Dept: GENERAL RADIOLOGY | Facility: CLINIC | Age: 44
End: 2024-11-26
Attending: INTERNAL MEDICINE
Payer: COMMERCIAL

## 2024-11-26 DIAGNOSIS — G89.29 CHRONIC BILATERAL LOW BACK PAIN WITHOUT SCIATICA: ICD-10-CM

## 2024-11-26 DIAGNOSIS — M54.50 CHRONIC BILATERAL LOW BACK PAIN WITHOUT SCIATICA: ICD-10-CM

## 2024-11-26 PROCEDURE — 72100 X-RAY EXAM L-S SPINE 2/3 VWS: CPT | Mod: TC | Performed by: RADIOLOGY

## 2025-02-08 ENCOUNTER — HEALTH MAINTENANCE LETTER (OUTPATIENT)
Age: 45
End: 2025-02-08

## 2025-02-10 ENCOUNTER — VIRTUAL VISIT (OUTPATIENT)
Dept: INTERNAL MEDICINE | Facility: CLINIC | Age: 45
End: 2025-02-10
Payer: COMMERCIAL

## 2025-02-10 ENCOUNTER — ANCILLARY PROCEDURE (OUTPATIENT)
Dept: GENERAL RADIOLOGY | Facility: CLINIC | Age: 45
End: 2025-02-10
Attending: INTERNAL MEDICINE
Payer: COMMERCIAL

## 2025-02-10 ENCOUNTER — LAB (OUTPATIENT)
Dept: LAB | Facility: CLINIC | Age: 45
End: 2025-02-10
Payer: COMMERCIAL

## 2025-02-10 DIAGNOSIS — R05.1 ACUTE COUGH: ICD-10-CM

## 2025-02-10 DIAGNOSIS — R05.1 ACUTE COUGH: Primary | ICD-10-CM

## 2025-02-10 LAB
FLUAV RNA SPEC QL NAA+PROBE: POSITIVE
FLUBV RNA RESP QL NAA+PROBE: NEGATIVE
RSV RNA SPEC NAA+PROBE: NEGATIVE
SARS-COV-2 RNA RESP QL NAA+PROBE: NEGATIVE

## 2025-02-10 PROCEDURE — 98005 SYNCH AUDIO-VIDEO EST LOW 20: CPT | Performed by: INTERNAL MEDICINE

## 2025-02-10 PROCEDURE — 87637 SARSCOV2&INF A&B&RSV AMP PRB: CPT

## 2025-02-10 PROCEDURE — 71046 X-RAY EXAM CHEST 2 VIEWS: CPT | Mod: TC | Performed by: RADIOLOGY

## 2025-02-10 RX ORDER — BENZONATATE 100 MG/1
100 CAPSULE ORAL 3 TIMES DAILY PRN
Qty: 15 CAPSULE | Refills: 0 | Status: SHIPPED | OUTPATIENT
Start: 2025-02-10

## 2025-02-10 RX ORDER — OSELTAMIVIR PHOSPHATE 75 MG/1
75 CAPSULE ORAL 2 TIMES DAILY
COMMUNITY
Start: 2025-02-07

## 2025-02-10 RX ORDER — HYDROXYZINE PAMOATE 50 MG/1
50 CAPSULE ORAL 3 TIMES DAILY PRN
COMMUNITY
Start: 2024-11-20

## 2025-02-10 RX ORDER — ALBUTEROL SULFATE 90 UG/1
2 INHALANT RESPIRATORY (INHALATION) EVERY 6 HOURS PRN
Qty: 18 G | Refills: 0 | Status: SHIPPED | OUTPATIENT
Start: 2025-02-10

## 2025-02-10 NOTE — PROGRESS NOTES
"Lexi is a 44 year old who is being evaluated via a billable video visit.    How would you like to obtain your AVS? MyChart  If the video visit is dropped, the invitation should be resent by: Text to cell phone: [phone]  Will anyone else be joining your video visit? No      Assessment & Plan     Acute cough  44-year-old woman without history of asthma who is a persistent cough for the past 5 days.  Daughter tested positive for influenza.  Meho[place]any made that her symptoms are also related to influenza and she started Tamiflu 75 mg twice daily on Friday, February 7.  She has seen minimal improvement in symptoms.  Cough remains persistent and bothersome.  Generalized malaise.  She was febrile although this has improved.  There is some sore throat.  It is possible that she may have something other than influenza given unexpected lack of response to treatment.  Alternatively, she may have severe influenza and it will simply take a number of days for her to start seeing any improvement.  She did not get her flu shot this year.  I am recommending getting viral testing completed for not only influenza but COVID and RSV and a chest x-ray is also ordered to evaluate for pneumonia.  While awaiting these results, I will get her an albuterol inhaler to use as needed and also Tessalon 100 mg 3 times daily as needed to suppress cough.  - benzonatate (TESSALON) 100 MG capsule; Take 1 capsule (100 mg) by mouth 3 times daily as needed for cough.  - albuterol (PROAIR HFA/PROVENTIL HFA/VENTOLIN HFA) 108 (90 Base) MCG/ACT inhaler; Inhale 2 puffs into the lungs every 6 hours as needed for shortness of breath, wheezing or cough.  - XR Chest 2 Views; Future  - Influenza A/B, RSV and SARS-CoV2 PCR (COVID-19); Future          BMI  Estimated body mass index is 26.05 kg/m  as calculated from the following:    Height as of 11/6/24: 1.765 m (5' 9.5\").    Weight as of 11/6/24: 81.2 kg (179 lb).         See Patient " Instructions    Eduardo Elliott is a 44 year old, presenting for the following health issues:  Follow Up (Did a virtual visit with urgency room Friday 2/7/25, was given Tamiflu but still not feeling better. )      2/10/2025     9:01 AM   Additional Questions   Roomed by      Video Start Time:  9:40 AM          Review of Systems  Constitutional, HEENT, cardiovascular, pulmonary, gi and gu systems are negative, except as otherwise noted.      Objective           Vitals:  No vitals were obtained today due to virtual visit.    Physical Exam     Well-appearing middle-age woman coughing frequently throughout visit but not appearing acutely short of breath        Video-Visit Details    Type of service:  Video Visit   Video End Time: 9:55 AM  Originating Location (pt. Location): Home    Distant Location (provider location):  On-site  Platform used for Video Visit: Rell  Signed Electronically by: Juan Kearney MD

## 2025-02-14 PROBLEM — J06.9 URI (UPPER RESPIRATORY INFECTION): Status: RESOLVED | Noted: 2024-11-06 | Resolved: 2025-02-14

## 2025-02-14 PROBLEM — J10.1 INFLUENZA A: Status: ACTIVE | Noted: 2025-02-14

## 2025-04-01 ENCOUNTER — TELEPHONE (OUTPATIENT)
Dept: ORTHOPEDICS | Facility: CLINIC | Age: 45
End: 2025-04-01
Payer: COMMERCIAL

## 2025-04-01 NOTE — TELEPHONE ENCOUNTER
Writer called and spoke with patient on the phonel. Pt states was standing up from the floor when felt a pull on knee. Working out the next day or two discomfort increased and now knee is swollen. Writer stated pt should have PCP evaluate or can be seen by sports medicine to get new imaging and determine cause of discomfort and swelling.    Michelle Soriano LPN

## 2025-04-01 NOTE — TELEPHONE ENCOUNTER
[facility] Call Center    Phone Message    May a detailed message be left on voicemail: yes     Reason for Call: Left Knee , Had Surgery in 2014. Surgical Area is Swollen and Painful. No Recent Images / Please Call Patient. Wants to Speak to Nurse Thanks.     Action Taken: Message routed to:  [facility] ([facility] madan    Travel Screening: Not Applicable     Date of Service: